# Patient Record
Sex: MALE | Race: WHITE | NOT HISPANIC OR LATINO | Employment: FULL TIME | URBAN - METROPOLITAN AREA
[De-identification: names, ages, dates, MRNs, and addresses within clinical notes are randomized per-mention and may not be internally consistent; named-entity substitution may affect disease eponyms.]

---

## 2023-07-20 ENCOUNTER — APPOINTMENT (EMERGENCY)
Dept: CT IMAGING | Facility: HOSPITAL | Age: 28
End: 2023-07-20
Payer: COMMERCIAL

## 2023-07-20 ENCOUNTER — HOSPITAL ENCOUNTER (EMERGENCY)
Facility: HOSPITAL | Age: 28
Discharge: HOME | End: 2023-07-21
Attending: EMERGENCY MEDICINE
Payer: COMMERCIAL

## 2023-07-20 VITALS
WEIGHT: 260.58 LBS | TEMPERATURE: 97.5 F | HEART RATE: 90 BPM | SYSTOLIC BLOOD PRESSURE: 155 MMHG | OXYGEN SATURATION: 97 % | RESPIRATION RATE: 18 BRPM | DIASTOLIC BLOOD PRESSURE: 75 MMHG

## 2023-07-20 DIAGNOSIS — H93.19 TINNITUS: ICD-10-CM

## 2023-07-20 DIAGNOSIS — J32.9 SINUSITIS: Primary | ICD-10-CM

## 2023-07-20 LAB
ALBUMIN SERPL BCP-MCNC: 4.9 G/DL (ref 3.5–5)
ALP SERPL-CCNC: 62 U/L (ref 34–104)
ALT SERPL W P-5'-P-CCNC: 32 U/L (ref 7–52)
ANION GAP SERPL CALCULATED.3IONS-SCNC: 9 MMOL/L
AST SERPL W P-5'-P-CCNC: 27 U/L (ref 13–39)
BASOPHILS # BLD AUTO: 0.03 THOUSANDS/ÂΜL (ref 0–0.1)
BASOPHILS NFR BLD AUTO: 0 % (ref 0–1)
BILIRUB SERPL-MCNC: 1.24 MG/DL (ref 0.2–1)
BUN SERPL-MCNC: 15 MG/DL (ref 5–25)
CALCIUM SERPL-MCNC: 9.3 MG/DL (ref 8.4–10.2)
CHLORIDE SERPL-SCNC: 102 MMOL/L (ref 96–108)
CO2 SERPL-SCNC: 27 MMOL/L (ref 21–32)
CREAT SERPL-MCNC: 1.08 MG/DL (ref 0.6–1.3)
EOSINOPHIL # BLD AUTO: 0.09 THOUSAND/ÂΜL (ref 0–0.61)
EOSINOPHIL NFR BLD AUTO: 1 % (ref 0–6)
ERYTHROCYTE [DISTWIDTH] IN BLOOD BY AUTOMATED COUNT: 11.9 % (ref 11.6–15.1)
GFR SERPL CREATININE-BSD FRML MDRD: 92 ML/MIN/1.73SQ M
GLUCOSE SERPL-MCNC: 92 MG/DL (ref 65–140)
HCT VFR BLD AUTO: 48.3 % (ref 36.5–49.3)
HGB BLD-MCNC: 17.3 G/DL (ref 12–17)
IMM GRANULOCYTES # BLD AUTO: 0.03 THOUSAND/UL (ref 0–0.2)
IMM GRANULOCYTES NFR BLD AUTO: 0 % (ref 0–2)
LYMPHOCYTES # BLD AUTO: 2.24 THOUSANDS/ÂΜL (ref 0.6–4.47)
LYMPHOCYTES NFR BLD AUTO: 19 % (ref 14–44)
MCH RBC QN AUTO: 32.1 PG (ref 26.8–34.3)
MCHC RBC AUTO-ENTMCNC: 35.8 G/DL (ref 31.4–37.4)
MCV RBC AUTO: 90 FL (ref 82–98)
MONOCYTES # BLD AUTO: 0.66 THOUSAND/ÂΜL (ref 0.17–1.22)
MONOCYTES NFR BLD AUTO: 6 % (ref 4–12)
NEUTROPHILS # BLD AUTO: 8.87 THOUSANDS/ÂΜL (ref 1.85–7.62)
NEUTS SEG NFR BLD AUTO: 74 % (ref 43–75)
NRBC BLD AUTO-RTO: 0 /100 WBCS
PLATELET # BLD AUTO: 215 THOUSANDS/UL (ref 149–390)
PMV BLD AUTO: 9.3 FL (ref 8.9–12.7)
POTASSIUM SERPL-SCNC: 3.7 MMOL/L (ref 3.5–5.3)
PROT SERPL-MCNC: 8.1 G/DL (ref 6.4–8.4)
RBC # BLD AUTO: 5.39 MILLION/UL (ref 3.88–5.62)
SODIUM SERPL-SCNC: 138 MMOL/L (ref 135–147)
WBC # BLD AUTO: 11.92 THOUSAND/UL (ref 4.31–10.16)

## 2023-07-20 PROCEDURE — 70450 CT HEAD/BRAIN W/O DYE: CPT

## 2023-07-20 PROCEDURE — 85025 COMPLETE CBC W/AUTO DIFF WBC: CPT | Performed by: EMERGENCY MEDICINE

## 2023-07-20 PROCEDURE — 80053 COMPREHEN METABOLIC PANEL: CPT | Performed by: EMERGENCY MEDICINE

## 2023-07-20 PROCEDURE — G1004 CDSM NDSC: HCPCS

## 2023-07-20 PROCEDURE — 70480 CT ORBIT/EAR/FOSSA W/O DYE: CPT

## 2023-07-20 PROCEDURE — 36415 COLL VENOUS BLD VENIPUNCTURE: CPT | Performed by: EMERGENCY MEDICINE

## 2023-07-21 RX ORDER — AZITHROMYCIN 250 MG/1
250 TABLET, FILM COATED ORAL DAILY
Qty: 4 TABLET | Refills: 0 | Status: SHIPPED | OUTPATIENT
Start: 2023-07-22 | End: 2023-07-26

## 2023-07-21 RX ORDER — AZITHROMYCIN 250 MG/1
500 TABLET, FILM COATED ORAL ONCE
Status: COMPLETED | OUTPATIENT
Start: 2023-07-21 | End: 2023-07-21

## 2023-07-21 RX ADMIN — AZITHROMYCIN MONOHYDRATE 500 MG: 250 TABLET ORAL at 01:10

## 2023-07-21 NOTE — ED NOTES
Pt now remembers that on Wednesday he hit the back of his head.        Freida Swenson RN  07/20/23 9491

## 2023-07-21 NOTE — ED PROVIDER NOTES
History  Chief Complaint   Patient presents with   • Tinnitus     Patient states he has had a ringing in his ear since last Friday. Has ENT appt for next Wednesday but has anxiety that it may be permanent and is unable to sleep. Also had a pop in his TMJ a few days ago which is causing him pain. Went to PCP and was given Voltaren which is not helping. Patient is a 28year old male with right sided tinnitus since last Friday. Has ENT appointment on this upcoming Wednesday. Had head trauma at work this past Wednesday over 1 week ago. No LOC. Occasional nausea at sight of food. Denies ear drop use. No excessive ASA use. No fever. No vomiting. No vertigo or dizziness. No decreased hearing. (+) anxiety. Was last seen at [facility] on 7/18/23 for medical exam. [facility] website checked on this patient and no Rx found. History provided by:  Patient (girlfriend)   used: No        None       History reviewed. No pertinent past medical history. History reviewed. No pertinent surgical history. History reviewed. No pertinent family history. I have reviewed and agree with the history as documented. E-Cigarette/Vaping     E-Cigarette/Vaping Substances     Social History     Tobacco Use   • Smoking status: Never   • Smokeless tobacco: Never   Substance Use Topics   • Alcohol use: Yes       Review of Systems   Constitutional: Negative for fever. HENT: Positive for tinnitus. Negative for hearing loss. Gastrointestinal: Positive for nausea (occasional). Negative for vomiting. Neurological: Negative for dizziness and headaches. No vertigo   Psychiatric/Behavioral: The patient is nervous/anxious. All other systems reviewed and are negative. Physical Exam  Physical Exam  Vitals and nursing note reviewed. Constitutional:       General: He is in acute distress (mild). HENT:      Head: Normocephalic and atraumatic.       Right Ear: Ear canal and external ear normal. There is no impacted cerumen. Left Ear: Tympanic membrane, ear canal and external ear normal.      Ears:      Comments: Fluid behind R TM. Mouth/Throat:      Mouth: Mucous membranes are moist.      Pharynx: Oropharynx is clear. No oropharyngeal exudate or posterior oropharyngeal erythema. Eyes:      General: No scleral icterus. Extraocular Movements: Extraocular movements intact. Pupils: Pupils are equal, round, and reactive to light. Cardiovascular:      Rate and Rhythm: Normal rate and regular rhythm. Heart sounds: Normal heart sounds. No murmur heard. Pulmonary:      Effort: Pulmonary effort is normal. No respiratory distress. Breath sounds: Normal breath sounds. Abdominal:      General: Bowel sounds are normal.      Palpations: Abdomen is soft. Tenderness: There is no abdominal tenderness. Musculoskeletal:         General: No deformity. Cervical back: Normal range of motion and neck supple. No rigidity. Right lower leg: No edema. Left lower leg: No edema. Skin:     General: Skin is warm and dry. Findings: No erythema or rash. Neurological:      General: No focal deficit present. Mental Status: He is alert and oriented to person, place, and time. Sensory: No sensory deficit. Motor: No weakness.    Psychiatric:      Comments: Somewhat anxious         Vital Signs  ED Triage Vitals [07/20/23 2057]   Temperature Pulse Respirations Blood Pressure SpO2   97.5 °F (36.4 °C) 90 18 155/75 97 %      Temp Source Heart Rate Source Patient Position - Orthostatic VS BP Location FiO2 (%)   Oral Monitor Lying Right arm --      Pain Score       --           Vitals:    07/20/23 2057   BP: 155/75   Pulse: 90   Patient Position - Orthostatic VS: Lying         Visual Acuity      ED Medications  Medications   azithromycin (ZITHROMAX) tablet 500 mg (has no administration in time range)       Diagnostic Studies  Results Reviewed     Procedure Component Value Units Date/Time    Comprehensive metabolic panel [[id number]]  (Abnormal) Collected: 07/20/23 2224    Lab Status: Final result Specimen: Blood from Arm, Right Updated: 07/20/23 2247     Sodium 138 mmol/L      Potassium 3.7 mmol/L      Chloride 102 mmol/L      CO2 27 mmol/L      ANION GAP 9 mmol/L      BUN 15 mg/dL      Creatinine 1.08 mg/dL      Glucose 92 mg/dL      Calcium 9.3 mg/dL      AST 27 U/L      ALT 32 U/L      Alkaline Phosphatase 62 U/L      Total Protein 8.1 g/dL      Albumin 4.9 g/dL      Total Bilirubin 1.24 mg/dL      eGFR 92 ml/min/1.73sq m     Narrative:      [place] guidelines for Chronic Kidney Disease (CKD):   •  Stage 1 with normal or high GFR (GFR > 90 mL/min/1.73 square meters)  •  Stage 2 Mild CKD (GFR = 60-89 mL/min/1.73 square meters)  •  Stage 3A Moderate CKD (GFR = 45-59 mL/min/1.73 square meters)  •  Stage 3B Moderate CKD (GFR = 30-44 mL/min/1.73 square meters)  •  Stage 4 Severe CKD (GFR = 15-29 mL/min/1.73 square meters)  •  Stage 5 End Stage CKD (GFR <15 mL/min/1.73 square meters)  Note: GFR calculation is accurate only with a steady state creatinine    CBC and differential [[id number]]  (Abnormal) Collected: 07/20/23 2224    Lab Status: Final result Specimen: Blood from Arm, Right Updated: 07/20/23 2234     WBC 11.92 Thousand/uL      RBC 5.39 Million/uL      Hemoglobin 17.3 g/dL      Hematocrit 48.3 %      MCV 90 fL      MCH 32.1 pg      MCHC 35.8 g/dL      RDW 11.9 %      MPV 9.3 fL      Platelets 070 Thousands/uL      nRBC 0 /100 WBCs      Neutrophils Relative 74 %      Immat GRANS % 0 %      Lymphocytes Relative 19 %      Monocytes Relative 6 %      Eosinophils Relative 1 %      Basophils Relative 0 %      Neutrophils Absolute 8.87 Thousands/µL      Immature Grans Absolute 0.03 Thousand/uL      Lymphocytes Absolute 2.24 Thousands/µL      Monocytes Absolute 0.66 Thousand/µL      Eosinophils Absolute 0.09 Thousand/µL      Basophils Absolute 0.03 Thousands/µL CT orbits/temporal bones/skull base wo contrast   ED Interpretation by Shirlene Morris MD (07/21 9761)   COMPARISON: No relevant prior studies available. FINDINGS: Right inner ear: Normal. Right ossicles and middle ear: Normal. The middle ear ossicles are intact. Right external auditory canal: Normal. Right facial nerve canal: Normal. Right jugular foramen: No jugular dehiscence. Right carotid canal: No aberrant carotid canal. Right mastoid air cells: Normal. No mastoid effusions. Left inner ear: Normal. Left ossicles and middle ear: Normal. The middle ear ossicles are intact. Left external auditory canal: Normal. Left facial nerve canal: Normal. Left jugular foramen: No jugular dehiscence. Left carotid canal: No aberrant carotid canal. Left mastoid air cells: Normal. No mastoid effusions. Paranasal sinuses: The right maxillary sinus is completely filled with HU 26 suggesting mildly complex fluid and age indeterminate sinusitis, possibly acute. Remainder of the paranasal sinuses are clear. Soft tissues: Unremarkable. IMPRESSION: Complete obstruction of the right maxillary sinus, consiste   nt with sinusitis that may be acute (depending upon clinical setting) Thank you for allowing us to participate in the care of your patient. Dictated and Authenticated by: Ghazala Bradford MD 07/21/2023 12:39 AM [facility] Time ([address] [place] [address] 19[facility]          CT head without contrast   ED Interpretation by Shirlene Morirs MD (07/21 0006)   FINDINGS:     PARENCHYMA:  No intracranial mass, mass effect or midline shift. No CT signs of acute infarction. No acute parenchymal hemorrhage.     VENTRICLES AND EXTRA-AXIAL SPACES:  Normal for the patient's age.     VISUALIZED ORBITS: Normal visualized orbits.     PARANASAL SINUSES: Opacification of the right maxillary sinus is seen. The remaining paranasal sinuses and mastoid air cells appear well aerated and clear without air-fluid levels. .     CALVARIUM AND EXTRACRANIAL SOFT TISSUES: Bony calvarium and temporomandibular joints are intact. Scalp soft tissues are grossly unremarkable.     IMPRESSION:     No acute intracranial abnormality. Complete opacification of the right maxillary sinus.                 Workstation performed: [id number]      Final Result by Cristian Garrett MD (07/20 2352)      No acute intracranial abnormality. Complete opacification of the right maxillary sinus. Workstation performed: [id number]                    Procedures  Procedures         ED Course  ED Course as of 07/21/23 0105   Thu Jul 20, 2023   2316 Labs d/w patient and girlfriend with patient's permission. Fri Jul 21, 2023   0100 CT d/w patient and girlfriend. 0102 WBC(!): 11.92  Zithromax po ordered for sinusitis. Medical Decision Making  DDx including but not limited to: tinnitus, cerumen impaction, trauma, presbycusis, hearing loss, otosclerosis, Meniere's disease, TMJ disorder, acoustic neuroma, other tumor, muscle spasm, multiple sclerosis, eustachian tube dysfunction, high blood pressure; doubt AVM, adverse medication reaction (antibiotics (mycins), aspirin, quinine, diuretics, MTX, cisplatin), antidepressants. Amount and/or Complexity of Data Reviewed  Labs: ordered. Decision-making details documented in ED Course. Radiology: ordered. Decision-making details documented in ED Course. Disposition  Final diagnoses:   Sinusitis   Tinnitus     Time reflects when diagnosis was documented in both MDM as applicable and the Disposition within this note     Time User Action Codes Description Comment    7/21/2023 12:07 AM Florestine Shorts Add [J32.9] Sinusitis     7/21/2023 12:07 AM Florestine Shorts Add [H93.19] Tinnitus       ED Disposition     ED Disposition   Discharge    Condition   Stable    Date/Time   Fri Jul 21, 2023  1:02 AM    Comment   Samaria Resendiz discharge to home/self care.                Follow-up Information Follow up With Specialties Details Why Dennis William MD Otolaryngology Call in 5 days or go to own ENT next week. Return sooner if increased ringing in ears, pain, fever, vomiting, rash, difficulty breathing. [address] [place] [address]            Patient's Medications   Discharge Prescriptions    AZITHROMYCIN (ZITHROMAX) 250 MG TABLET    Take 1 tablet (250 mg total) by mouth daily for 4 days Do not start before July 22, 2023. Start Date: 7/22/2023 End Date: 7/26/2023       Order Dose: 250 mg       Quantity: 4 tablet    Refills: 0       No discharge procedures on file.     PDMP Review       Value Time User    PDMP Reviewed  Yes 7/20/2023 10:01 PM Nohemi Villeda MD          ED Provider  Electronically Signed by           Nohemi Villeda MD  07/21/23 0640

## 2024-01-29 LAB
BASOPHILS # BLD AUTO: 0.04 THOUSANDS/ÂΜL (ref 0–0.1)
BASOPHILS NFR BLD AUTO: 0 % (ref 0–1)
EOSINOPHIL # BLD AUTO: 0.3 THOUSAND/ÂΜL (ref 0–0.61)
EOSINOPHIL NFR BLD AUTO: 3 % (ref 0–6)
ERYTHROCYTE [DISTWIDTH] IN BLOOD BY AUTOMATED COUNT: 12 % (ref 11.6–15.1)
HCT VFR BLD AUTO: 47.2 % (ref 36.5–49.3)
HGB BLD-MCNC: 16.9 G/DL (ref 12–17)
IMM GRANULOCYTES # BLD AUTO: 0.04 THOUSAND/UL (ref 0–0.2)
IMM GRANULOCYTES NFR BLD AUTO: 0 % (ref 0–2)
LYMPHOCYTES # BLD AUTO: 2.86 THOUSANDS/ÂΜL (ref 0.6–4.47)
LYMPHOCYTES NFR BLD AUTO: 28 % (ref 14–44)
MCH RBC QN AUTO: 32.3 PG (ref 26.8–34.3)
MCHC RBC AUTO-ENTMCNC: 35.8 G/DL (ref 31.4–37.4)
MCV RBC AUTO: 90 FL (ref 82–98)
MONOCYTES # BLD AUTO: 0.68 THOUSAND/ÂΜL (ref 0.17–1.22)
MONOCYTES NFR BLD AUTO: 7 % (ref 4–12)
NEUTROPHILS # BLD AUTO: 6.17 THOUSANDS/ÂΜL (ref 1.85–7.62)
NEUTS SEG NFR BLD AUTO: 62 % (ref 43–75)
NRBC BLD AUTO-RTO: 0 /100 WBCS
PLATELET # BLD AUTO: 212 THOUSANDS/UL (ref 149–390)
PMV BLD AUTO: 9.6 FL (ref 8.9–12.7)
RBC # BLD AUTO: 5.23 MILLION/UL (ref 3.88–5.62)
WBC # BLD AUTO: 10.09 THOUSAND/UL (ref 4.31–10.16)

## 2024-01-29 PROCEDURE — 85025 COMPLETE CBC W/AUTO DIFF WBC: CPT | Performed by: EMERGENCY MEDICINE

## 2024-01-29 PROCEDURE — 84484 ASSAY OF TROPONIN QUANT: CPT | Performed by: EMERGENCY MEDICINE

## 2024-01-29 PROCEDURE — 80053 COMPREHEN METABOLIC PANEL: CPT | Performed by: EMERGENCY MEDICINE

## 2024-01-29 PROCEDURE — 93005 ELECTROCARDIOGRAM TRACING: CPT

## 2024-01-29 PROCEDURE — 36415 COLL VENOUS BLD VENIPUNCTURE: CPT

## 2024-01-29 PROCEDURE — 99285 EMERGENCY DEPT VISIT HI MDM: CPT

## 2024-01-30 ENCOUNTER — APPOINTMENT (EMERGENCY)
Dept: RADIOLOGY | Facility: HOSPITAL | Age: 29
End: 2024-01-30
Payer: COMMERCIAL

## 2024-01-30 ENCOUNTER — HOSPITAL ENCOUNTER (EMERGENCY)
Facility: HOSPITAL | Age: 29
Discharge: HOME | End: 2024-01-30
Attending: EMERGENCY MEDICINE
Payer: COMMERCIAL

## 2024-01-30 VITALS
HEART RATE: 91 BPM | TEMPERATURE: 98 F | SYSTOLIC BLOOD PRESSURE: 145 MMHG | DIASTOLIC BLOOD PRESSURE: 94 MMHG | RESPIRATION RATE: 18 BRPM | OXYGEN SATURATION: 98 %

## 2024-01-30 DIAGNOSIS — R00.2 PALPITATIONS: Primary | ICD-10-CM

## 2024-01-30 LAB
ALBUMIN SERPL BCP-MCNC: 4.7 G/DL (ref 3.5–5)
ALP SERPL-CCNC: 61 U/L (ref 34–104)
ALT SERPL W P-5'-P-CCNC: 34 U/L (ref 7–52)
ANION GAP SERPL CALCULATED.3IONS-SCNC: 8 MMOL/L
AST SERPL W P-5'-P-CCNC: 23 U/L (ref 13–39)
ATRIAL RATE: 86 BPM
BILIRUB SERPL-MCNC: 0.8 MG/DL (ref 0.2–1)
BUN SERPL-MCNC: 14 MG/DL (ref 5–25)
CALCIUM SERPL-MCNC: 9.3 MG/DL (ref 8.4–10.2)
CARDIAC TROPONIN I PNL SERPL HS: <2 NG/L
CHLORIDE SERPL-SCNC: 102 MMOL/L (ref 96–108)
CO2 SERPL-SCNC: 26 MMOL/L (ref 21–32)
CREAT SERPL-MCNC: 0.94 MG/DL (ref 0.6–1.3)
GFR SERPL CREATININE-BSD FRML MDRD: 109 ML/MIN/1.73SQ M
GLUCOSE SERPL-MCNC: 73 MG/DL (ref 65–140)
GLUCOSE SERPL-MCNC: 94 MG/DL (ref 65–140)
P AXIS: 50 DEGREES
POTASSIUM SERPL-SCNC: 3.7 MMOL/L (ref 3.5–5.3)
PR INTERVAL: 180 MS
PROT SERPL-MCNC: 8 G/DL (ref 6.4–8.4)
QRS AXIS: 7 DEGREES
QRSD INTERVAL: 90 MS
QT INTERVAL: 352 MS
QTC INTERVAL: 421 MS
SODIUM SERPL-SCNC: 136 MMOL/L (ref 135–147)
T WAVE AXIS: 4 DEGREES
VENTRICULAR RATE: 86 BPM

## 2024-01-30 PROCEDURE — 71045 X-RAY EXAM CHEST 1 VIEW: CPT

## 2024-01-30 PROCEDURE — 99285 EMERGENCY DEPT VISIT HI MDM: CPT | Performed by: EMERGENCY MEDICINE

## 2024-01-30 PROCEDURE — 82948 REAGENT STRIP/BLOOD GLUCOSE: CPT

## 2024-01-30 PROCEDURE — 93010 ELECTROCARDIOGRAM REPORT: CPT | Performed by: INTERNAL MEDICINE

## 2024-01-30 NOTE — ED PROVIDER NOTES
"History  Chief Complaint   Patient presents with    Palpitations     Reports episode today of \"a sudden chill down my back\" reports felt lightheaded, cold sweats and felt heart racing states HR was in 130s. Reprots episode lasted for 45 minutes. Denies CP or SOB, reports mild sensation of palpitations     HPI    27 yo male presents for evaluation of palpitations.  Reports intermittent symptoms for the past 1 month, which she describes as a \"chill\" in his lower back, that is present intermittently throughout the day, not associated with any symptoms.  Today, however, while he was walking at work he developed the same chills, with associated nausea, diaphoresis, lightheadedness and palpitations.  Heart rate at time of incident and 130s.  Episode lasted for about 45 minutes to an hour, did not improve with food or rest, gradually subsided over the hour.  He does report he has had several stressors in the past 3 months, including the death of his mother and pregnancy of his fiancée.  Does not have a history of anxiety.  Denies nausea, headache, abdominal pain, diarrhea, chest pain, SOB at this time.    Prior to Admission Medications   Prescriptions Last Dose Informant Patient Reported? Taking?   amoxicillin-clavulanate (AUGMENTIN) 875-125 mg per tablet   Yes No   betamethasone dipropionate (DIPROSONE) 0.05 % ointment   Yes No   hydrOXYzine HCL (ATARAX) 25 mg tablet   Yes No   Patient not taking: Reported on 7/26/2023      Facility-Administered Medications: None       Past Medical History:   Diagnosis Date    Asthma     Ear problems     Tinnitus        Past Surgical History:   Procedure Laterality Date    WISDOM TOOTH EXTRACTION         No family history on file.  I have reviewed and agree with the history as documented.    E-Cigarette/Vaping     E-Cigarette/Vaping Substances     Social History     Tobacco Use    Smoking status: Never    Smokeless tobacco: Never   Substance Use Topics    Alcohol use: Yes        Review of " Systems   Constitutional:  Positive for diaphoresis. Negative for chills and fever.   HENT:  Negative for rhinorrhea and sore throat.    Respiratory:  Negative for cough and shortness of breath.    Cardiovascular:  Negative for chest pain.   Gastrointestinal:  Negative for abdominal pain, nausea and vomiting.   Genitourinary:  Negative for dysuria and frequency.   Skin:  Negative for color change.   Neurological:  Positive for light-headedness. Negative for dizziness and headaches.       Physical Exam  ED Triage Vitals [01/29/24 2328]   Temperature Pulse Respirations Blood Pressure SpO2   98 °F (36.7 °C) 104 18 145/94 98 %      Temp Source Heart Rate Source Patient Position - Orthostatic VS BP Location FiO2 (%)   Oral Monitor -- -- --      Pain Score       No Pain             Orthostatic Vital Signs  Vitals:    01/29/24 2328 01/30/24 0120   BP: 145/94    Pulse: 104 91       Physical Exam  Vitals and nursing note reviewed.   Constitutional:       General: He is not in acute distress.     Appearance: He is well-developed.   HENT:      Head: Normocephalic and atraumatic.      Mouth/Throat:      Mouth: Mucous membranes are moist.   Eyes:      Conjunctiva/sclera: Conjunctivae normal.      Pupils: Pupils are equal, round, and reactive to light.   Cardiovascular:      Rate and Rhythm: Normal rate and regular rhythm.      Heart sounds: No murmur heard.  Pulmonary:      Effort: Pulmonary effort is normal. No respiratory distress.      Breath sounds: Normal breath sounds.   Abdominal:      Palpations: Abdomen is soft.      Tenderness: There is no abdominal tenderness.   Musculoskeletal:         General: No swelling.      Cervical back: Neck supple.   Skin:     General: Skin is warm and dry.      Capillary Refill: Capillary refill takes less than 2 seconds.   Neurological:      General: No focal deficit present.      Mental Status: He is alert.   Psychiatric:         Mood and Affect: Mood normal.         ED  Medications  Medications - No data to display    Diagnostic Studies  Results Reviewed       Procedure Component Value Units Date/Time    Fingerstick Glucose (POCT) [[id number]]  (Normal) Collected: 01/30/24 0038    Lab Status: Final result Updated: 01/30/24 0040     POC Glucose 73 mg/dl     HS Troponin 0hr (reflex protocol) [[id number]]  (Normal) Collected: 01/29/24 2333    Lab Status: Final result Specimen: Blood from Arm, Left Updated: 01/30/24 0019     hs TnI 0hr <2 ng/L     Comprehensive metabolic panel [[id number]] Collected: 01/29/24 2333    Lab Status: Final result Specimen: Blood from Arm, Left Updated: 01/30/24 0005     Sodium 136 mmol/L      Potassium 3.7 mmol/L      Chloride 102 mmol/L      CO2 26 mmol/L      ANION GAP 8 mmol/L      BUN 14 mg/dL      Creatinine 0.94 mg/dL      Glucose 94 mg/dL      Calcium 9.3 mg/dL      AST 23 U/L      ALT 34 U/L      Alkaline Phosphatase 61 U/L      Total Protein 8.0 g/dL      Albumin 4.7 g/dL      Total Bilirubin 0.80 mg/dL      eGFR 109 ml/min/1.73sq m     Narrative:      [employer] guidelines for Chronic Kidney Disease (CKD):     Stage 1 with normal or high GFR (GFR > 90 mL/min/1.73 square meters)    Stage 2 Mild CKD (GFR = 60-89 mL/min/1.73 square meters)    Stage 3A Moderate CKD (GFR = 45-59 mL/min/1.73 square meters)    Stage 3B Moderate CKD (GFR = 30-44 mL/min/1.73 square meters)    Stage 4 Severe CKD (GFR = 15-29 mL/min/1.73 square meters)    Stage 5 End Stage CKD (GFR <15 mL/min/1.73 square meters)  Note: GFR calculation is accurate only with a steady state creatinine    CBC and differential [[id number]] Collected: 01/29/24 2333    Lab Status: Final result Specimen: Blood from Arm, Left Updated: 01/29/24 2347     WBC 10.09 Thousand/uL      RBC 5.23 Million/uL      Hemoglobin 16.9 g/dL      Hematocrit 47.2 %      MCV 90 fL      MCH 32.3 pg      MCHC 35.8 g/dL      RDW 12.0 %      MPV 9.6 fL      Platelets 212 Thousands/uL      nRBC 0 /100  WBCs      Neutrophils Relative 62 %      Immat GRANS % 0 %      Lymphocytes Relative 28 %      Monocytes Relative 7 %      Eosinophils Relative 3 %      Basophils Relative 0 %      Neutrophils Absolute 6.17 Thousands/µL      Immature Grans Absolute 0.04 Thousand/uL      Lymphocytes Absolute 2.86 Thousands/µL      Monocytes Absolute 0.68 Thousand/µL      Eosinophils Absolute 0.30 Thousand/µL      Basophils Absolute 0.04 Thousands/µL                    XR chest 1 view portable   Final Result by Richard Boss MD (01/30 0910)      No acute cardiopulmonary disease.                  Resident: JEFF LERNER I, the attending radiologist, have reviewed the images and agree with the final report above.      Workstation performed: [id number]               Procedures  ECG 12 Lead Documentation Only    Date/Time: 1/30/2024 1:07 AM    Performed by: Javad Schuler MD  Authorized by: Javad Schuler MD    Rate:     ECG rate:  86  Rhythm:     Rhythm: sinus rhythm    QRS:     QRS axis:  Normal    QRS intervals:  Normal  Comments:      No ST elevations or ischemic changes.  Normal [place] and QT intervals.        ED Course                                       Medical Decision Making  28-year-old male presents for evaluation of palpitations.    Differentials include but not limited to ACS, arrhythmia, pneumonia, anxiety, panic attack.    Tests: CBC, CMP, Troponin, CXR ordered.     Labs, and CXR unremarkable. ACS ruled out. VSS. Discussed findings with pt, and recommended follow up with PCP and cardiology for further evaluation.     Amount and/or Complexity of Data Reviewed  Labs: ordered.  Radiology: ordered.          Disposition  Final diagnoses:   Palpitations     Time reflects when diagnosis was documented in both MDM as applicable and the Disposition within this note       Time User Action Codes Description Comment    1/30/2024  1:31 AM Javad Schuler Add [R00.2] Palpitations           ED Disposition        ED Disposition   Discharge    Condition   Stable    Date/Time   Tue Jan 30, 2024  1:32 AM    Comment   George Bowles discharge to home/self care.                   Follow-up Information    None         Discharge Medication List as of 1/30/2024  1:33 AM        CONTINUE these medications which have NOT CHANGED    Details   amoxicillin-clavulanate (AUGMENTIN) 875-125 mg per tablet Starting Fri 7/21/2023, Historical Med      betamethasone dipropionate (DIPROSONE) 0.05 % ointment Starting Tue 7/18/2023, Historical Med      hydrOXYzine HCL (ATARAX) 25 mg tablet Starting Fri 7/21/2023, Historical Med               PDMP Review         Value Time User    PDMP Reviewed  Yes 7/20/2023 10:01 PM Navid Damico MD             ED Provider  Attending physically available and evaluated George Bowles. I managed the patient along with the ED Attending.    Electronically Signed by           Javad Schuler MD  02/04/24 9575

## 2024-01-30 NOTE — DISCHARGE INSTRUCTIONS
Please follow up with your PCP for further management.     Please return to ED if you develop chest pain, difficulty breathing, seizures, passing out.

## 2024-01-30 NOTE — ED ATTENDING ATTESTATION
1/29/2024  IAnuja DO, saw and evaluated the patient. I have discussed the patient with the resident/non-physician practitioner and agree with the resident's/non-physician practitioner's findings, Plan of Care, and MDM as documented in the resident's/non-physician practitioner's note, except where noted. All available labs and Radiology studies were reviewed.  I was present for key portions of any procedure(s) performed by the resident/non-physician practitioner and I was immediately available to provide assistance.       At this point I agree with the current assessment done in the Emergency Department.  I have conducted an independent evaluation of this patient a history and physical is as follows:    History  Patient is a 28 y.o. year old male who presents for evaluation with palpitations. Patient states that today while he was walking, he developed a sensation of heart palpitations and lightheadedness. Patient checked his watch at the time which said that his HR was in the 130's. Patient states that the symptoms lasted for a few seconds and then resolved spontaneously.  He denies any previous episodes of similar symptoms, denies any current symptoms including chest pain, shortness of breath, palpitations, abdominal pain, nausea, or vomiting.  Patient does endorse some recent life stressors.     Current Outpatient Medications   Medication Instructions    amoxicillin-clavulanate (AUGMENTIN) 875-125 mg per tablet No dose, route, or frequency recorded.    betamethasone dipropionate (DIPROSONE) 0.05 % ointment No dose, route, or frequency recorded.    hydrOXYzine HCL (ATARAX) 25 mg tablet No dose, route, or frequency recorded.     Past Medical History:   Diagnosis Date    Asthma     Ear problems     Tinnitus      Past Surgical History:   Procedure Laterality Date    WISDOM TOOTH EXTRACTION         Objective  Vitals:    01/29/24 2328 01/30/24 0120   BP: 145/94    Pulse: 104 91   Resp: 18    Temp: 98 °F (36.7  °C)    TempSrc: Oral    SpO2: 98%        General: VSS, NAD, awake, alert.    Head: Normocephalic, atraumatic.  Eyes: PERRL, EOM-I.   ENT: Atraumatic external nose and ears.  MMM. No stridor.   Neck: Symmetric, supple, trachea midline.  CV: RRR. +S1/S2. No murmurs. Peripheral pulses +2 throughout.   Lungs: Respirations unlabored, no tachypnea. CTAB, lungs sounds equal bilateral.   MSK: Extremities without tenderness or gross deformity. No lower extremity edema.   Skin: Dry, intact. No lesions.  Neuro: AAOx3, GCS 15, CN II-XII grossly intact. Motor grossly intact. Sensory grossly intact.  Psychiatric/Behavioral: Appropriate mood and affect. Behavior normal.    Results Reviewed       Procedure Component Value Units Date/Time    Fingerstick Glucose (POCT) [[id number]]  (Normal) Collected: 01/30/24 0038    Lab Status: Final result Updated: 01/30/24 0040     POC Glucose 73 mg/dl     HS Troponin 0hr (reflex protocol) [[id number]]  (Normal) Collected: 01/29/24 2333    Lab Status: Final result Specimen: Blood from Arm, Left Updated: 01/30/24 0019     hs TnI 0hr <2 ng/L     Comprehensive metabolic panel [[id number]] Collected: 01/29/24 2333    Lab Status: Final result Specimen: Blood from Arm, Left Updated: 01/30/24 0005     Sodium 136 mmol/L      Potassium 3.7 mmol/L      Chloride 102 mmol/L      CO2 26 mmol/L      ANION GAP 8 mmol/L      BUN 14 mg/dL      Creatinine 0.94 mg/dL      Glucose 94 mg/dL      Calcium 9.3 mg/dL      AST 23 U/L      ALT 34 U/L      Alkaline Phosphatase 61 U/L      Total Protein 8.0 g/dL      Albumin 4.7 g/dL      Total Bilirubin 0.80 mg/dL      eGFR 109 ml/min/1.73sq m     Narrative:      National Kidney Disease Foundation guidelines for Chronic Kidney Disease (CKD):     Stage 1 with normal or high GFR (GFR > 90 mL/min/1.73 square meters)    Stage 2 Mild CKD (GFR = 60-89 mL/min/1.73 square meters)    Stage 3A Moderate CKD (GFR = 45-59 mL/min/1.73 square meters)    Stage 3B Moderate CKD (GFR = 30-44  mL/min/1.73 square meters)    Stage 4 Severe CKD (GFR = 15-29 mL/min/1.73 square meters)    Stage 5 End Stage CKD (GFR <15 mL/min/1.73 square meters)  Note: GFR calculation is accurate only with a steady state creatinine    CBC and differential [[id number]] Collected: 01/29/24 2333    Lab Status: Final result Specimen: Blood from Arm, Left Updated: 01/29/24 2347     WBC 10.09 Thousand/uL      RBC 5.23 Million/uL      Hemoglobin 16.9 g/dL      Hematocrit 47.2 %      MCV 90 fL      MCH 32.3 pg      MCHC 35.8 g/dL      RDW 12.0 %      MPV 9.6 fL      Platelets 212 Thousands/uL      nRBC 0 /100 WBCs      Neutrophils Relative 62 %      Immat GRANS % 0 %      Lymphocytes Relative 28 %      Monocytes Relative 7 %      Eosinophils Relative 3 %      Basophils Relative 0 %      Neutrophils Absolute 6.17 Thousands/µL      Immature Grans Absolute 0.04 Thousand/uL      Lymphocytes Absolute 2.86 Thousands/µL      Monocytes Absolute 0.68 Thousand/µL      Eosinophils Absolute 0.30 Thousand/µL      Basophils Absolute 0.04 Thousands/µL           XR chest 1 view portable    (Results Pending)     Medications - No data to display  ED Course as of 01/30/24 0205   Tue Jan 30, 2024   0132 Procedure Note: EKG  Date/Time: 01/30/24 1:32 AM   Interpreted by: Anuja Nava D.O.  Indications / Diagnosis: palpitations  ECG reviewed by me, the ED Provider: yes   The EKG demonstrates:  Rhythm: normal sinus with sinus arrhythmia  Intervals: normal intervals  Axis: normal axis  QRS/Blocks: normal QRS  ST Changes: No acute ST Changes, no STD/AYE. Non-specific T wave inversion in lead III.        MDM  28-year-old male presents for evaluation of palpitations.  On exam, patient with normal vitals, in no acute distress.  Patient's EKG shows sinus rhythm with a sinus arrhythmia, otherwise negative for acute ischemic changes or other pathology.  Basic labs including CBC and CMP unremarkable.  Patient remains asymptomatic at this time.  Patient given a referral  for cardiology for further evaluation.  At this time, patient stable for discharge.  He was given symptomatic care instructions and strict ED return precautions.    Final Diagnosis:  1. Palpitations          Critical Care Time  Procedures

## 2024-09-18 ENCOUNTER — HOSPITAL ENCOUNTER (EMERGENCY)
Facility: HOSPITAL | Age: 29
Discharge: HOME | End: 2024-09-19
Attending: INTERNAL MEDICINE
Payer: COMMERCIAL

## 2024-09-18 VITALS
RESPIRATION RATE: 20 BRPM | SYSTOLIC BLOOD PRESSURE: 123 MMHG | TEMPERATURE: 99.7 F | OXYGEN SATURATION: 100 % | WEIGHT: 265 LBS | BODY MASS INDEX: 37.1 KG/M2 | HEART RATE: 112 BPM | DIASTOLIC BLOOD PRESSURE: 86 MMHG | HEIGHT: 71 IN

## 2024-09-18 DIAGNOSIS — B34.9 VIRAL ILLNESS: Primary | ICD-10-CM

## 2024-09-18 LAB — GLUCOSE SERPL-MCNC: 123 MG/DL (ref 65–140)

## 2024-09-18 PROCEDURE — 93005 ELECTROCARDIOGRAM TRACING: CPT

## 2024-09-18 PROCEDURE — 87811 SARS-COV-2 COVID19 W/OPTIC: CPT

## 2024-09-18 PROCEDURE — 82948 REAGENT STRIP/BLOOD GLUCOSE: CPT

## 2024-09-18 PROCEDURE — 87804 INFLUENZA ASSAY W/OPTIC: CPT

## 2024-09-18 PROCEDURE — 99285 EMERGENCY DEPT VISIT HI MDM: CPT | Performed by: INTERNAL MEDICINE

## 2024-09-18 PROCEDURE — 99284 EMERGENCY DEPT VISIT MOD MDM: CPT

## 2024-09-18 RX ORDER — IBUPROFEN 600 MG/1
600 TABLET, FILM COATED ORAL ONCE
Status: COMPLETED | OUTPATIENT
Start: 2024-09-18 | End: 2024-09-18

## 2024-09-18 RX ORDER — ACETAMINOPHEN 325 MG/1
975 TABLET ORAL ONCE
Status: COMPLETED | OUTPATIENT
Start: 2024-09-18 | End: 2024-09-18

## 2024-09-18 RX ADMIN — ACETAMINOPHEN 325MG 975 MG: 325 TABLET ORAL at 23:13

## 2024-09-18 RX ADMIN — IBUPROFEN 600 MG: 600 TABLET ORAL at 23:13

## 2024-09-19 LAB
ATRIAL RATE: 118 BPM
FLUAV AG UPPER RESP QL IA.RAPID: NEGATIVE
FLUBV AG UPPER RESP QL IA.RAPID: NEGATIVE
P AXIS: 58 DEGREES
PR INTERVAL: 174 MS
QRS AXIS: 58 DEGREES
QRSD INTERVAL: 88 MS
QT INTERVAL: 310 MS
QTC INTERVAL: 434 MS
SARS-COV+SARS-COV-2 AG RESP QL IA.RAPID: NEGATIVE
T WAVE AXIS: 18 DEGREES
VENTRICULAR RATE: 118 BPM

## 2024-09-19 PROCEDURE — 93010 ELECTROCARDIOGRAM REPORT: CPT | Performed by: INTERNAL MEDICINE

## 2024-09-19 NOTE — ED PROVIDER NOTES
No diagnosis found.  ED Disposition       None          Assessment & Plan       Medical Decision Making  This patient presents with symptoms suspicious for likely viral upper respiratory infection. Based on history and physical doubt sinusitis. FLU/COVID test negative. Do not suspect underlying cardiopulmonary process. I considered, but think unlikely, dangerous causes of this patient's symptoms to include ACS, CHF or COPD exacerbations, pneumonia, pneumothorax. Patient is nontoxic appearing and not in need of emergent medical intervention. Patient told to self isolate at home until symptoms subside for 72 hours. Elevated HR and temp likely sequela of underlying URI. Patient discharged home with return precautions discussed.    Amount and/or Complexity of Data Reviewed  Labs: ordered.    Risk  OTC drugs.  Prescription drug management.                     Medications - No data to display    History of Present Illness       Pt is a 28 y.o. male who presents to the ED on September 18, 2024. Patient presents with chills, myalgias, fever, cough.  Patient states that starting earlier with this morning, he started to have after mentioned symptoms.  Patient also states he has associated loss of appetite, lethargy, and fatigue.  States that mother his child recently felt sick and had similar symptoms.  Patient attempted taking Tylenol with minimal change in symptoms.  Patient also found to be persistently tachycardic.  Denies any palpitations, or chest pain.  Patient denies any  shortness of breath, abdominal pain, nausea, vomiting, rigors, tremors, any other symptoms at this time.          Review of Systems   Constitutional:  Positive for chills, fatigue and fever.   HENT:  Negative for ear pain and sore throat.    Eyes:  Negative for pain and visual disturbance.   Respiratory:  Positive for cough. Negative for chest tightness, shortness of breath, wheezing and stridor.    Cardiovascular:  Negative for chest pain and  palpitations.   Gastrointestinal:  Negative for abdominal pain, constipation, diarrhea, nausea and vomiting.   Genitourinary:  Negative for dysuria and hematuria.   Musculoskeletal:  Positive for myalgias. Negative for arthralgias and back pain.   Skin:  Negative for color change and rash.   Neurological:  Negative for seizures and syncope.   All other systems reviewed and are negative.          Objective     ED Triage Vitals   Temperature Pulse Blood Pressure Respirations SpO2 Patient Position - Orthostatic VS   09/18/24 2221 09/18/24 2224 09/18/24 2224 09/18/24 2224 09/18/24 2224 09/18/24 2224   99.7 °F (37.6 °C) (!) 126 123/86 20 100 % Sitting      Temp Source Heart Rate Source BP Location FiO2 (%) Pain Score    09/18/24 2221 -- 09/18/24 2224 -- --    Oral  Left arm          Physical Exam  Vitals and nursing note reviewed.   Constitutional:       General: He is not in acute distress.     Appearance: He is well-developed.   HENT:      Head: Normocephalic and atraumatic.      Nose: Congestion and rhinorrhea present.   Eyes:      Conjunctiva/sclera: Conjunctivae normal.   Cardiovascular:      Rate and Rhythm: Regular rhythm. Tachycardia present.      Heart sounds: No murmur heard.     No friction rub. No gallop.   Pulmonary:      Effort: Pulmonary effort is normal. No respiratory distress.      Breath sounds: Normal breath sounds. No wheezing, rhonchi or rales.   Abdominal:      General: There is no distension.      Palpations: Abdomen is soft.      Tenderness: There is no abdominal tenderness. There is no guarding or rebound.   Musculoskeletal:         General: No swelling.      Cervical back: Neck supple.   Skin:     General: Skin is warm and dry.      Capillary Refill: Capillary refill takes less than 2 seconds.   Neurological:      Mental Status: He is alert.   Psychiatric:         Mood and Affect: Mood normal.         Labs Reviewed - No data to display  No orders to display       ECG 12 Lead Documentation  Only    Date/Time: 9/18/2024 11:30 PM    Performed by: Oscar Edwards MD  Authorized by: Oscar Edwards MD    ECG reviewed by me, the ED Provider: yes    Previous ECG:     Previous ECG:  Compared to current    Similarity:  No change  Interpretation:     Interpretation: normal    Rate:     ECG rate:  86    ECG rate assessment: normal    Rhythm:     Rhythm: sinus rhythm    Ectopy:     Ectopy: none    QRS:     QRS axis:  Normal  Conduction:     Conduction: normal    ST segments:     ST segments:  Normal  T waves:     T waves: normal             Oscar Edwards MD  09/19/24 0008

## 2024-09-19 NOTE — DISCHARGE INSTRUCTIONS
Please get adequate rest and hydration for your viral illness.  Please follow-up with your PCP.  For fever reduction please alternate ibuprofen 600 mg and extra strength Tylenol 1000 mg every 4 hours as needed while awake.  Please take appropriate isolation precautions.

## 2024-09-20 ENCOUNTER — HOSPITAL ENCOUNTER (EMERGENCY)
Facility: HOSPITAL | Age: 29
Discharge: HOME | End: 2024-09-20
Attending: EMERGENCY MEDICINE
Payer: COMMERCIAL

## 2024-09-20 ENCOUNTER — APPOINTMENT (EMERGENCY)
Dept: RADIOLOGY | Facility: HOSPITAL | Age: 29
End: 2024-09-20
Payer: COMMERCIAL

## 2024-09-20 VITALS
HEART RATE: 100 BPM | TEMPERATURE: 98.1 F | RESPIRATION RATE: 22 BRPM | SYSTOLIC BLOOD PRESSURE: 133 MMHG | DIASTOLIC BLOOD PRESSURE: 80 MMHG | OXYGEN SATURATION: 97 %

## 2024-09-20 DIAGNOSIS — J06.9 URI (UPPER RESPIRATORY INFECTION): Primary | ICD-10-CM

## 2024-09-20 LAB
ANION GAP SERPL CALCULATED.3IONS-SCNC: 10 MMOL/L (ref 4–13)
BASOPHILS # BLD AUTO: 0.04 THOUSANDS/ΜL (ref 0–0.1)
BASOPHILS NFR BLD AUTO: 0 % (ref 0–1)
BUN SERPL-MCNC: 10 MG/DL (ref 5–25)
CALCIUM SERPL-MCNC: 9.2 MG/DL (ref 8.4–10.2)
CHLORIDE SERPL-SCNC: 103 MMOL/L (ref 96–108)
CO2 SERPL-SCNC: 22 MMOL/L (ref 21–32)
CREAT SERPL-MCNC: 1.1 MG/DL (ref 0.6–1.3)
EOSINOPHIL # BLD AUTO: 0.02 THOUSAND/ΜL (ref 0–0.61)
EOSINOPHIL NFR BLD AUTO: 0 % (ref 0–6)
ERYTHROCYTE [DISTWIDTH] IN BLOOD BY AUTOMATED COUNT: 11.9 % (ref 11.6–15.1)
GFR SERPL CREATININE-BSD FRML MDRD: 89 ML/MIN/1.73SQ M
GLUCOSE SERPL-MCNC: 89 MG/DL (ref 65–140)
HCT VFR BLD AUTO: 44.7 % (ref 36.5–49.3)
HGB BLD-MCNC: 16.1 G/DL (ref 12–17)
IMM GRANULOCYTES # BLD AUTO: 0.04 THOUSAND/UL (ref 0–0.2)
IMM GRANULOCYTES NFR BLD AUTO: 0 % (ref 0–2)
LYMPHOCYTES # BLD AUTO: 0.87 THOUSANDS/ΜL (ref 0.6–4.47)
LYMPHOCYTES NFR BLD AUTO: 9 % (ref 14–44)
MCH RBC QN AUTO: 32.3 PG (ref 26.8–34.3)
MCHC RBC AUTO-ENTMCNC: 36 G/DL (ref 31.4–37.4)
MCV RBC AUTO: 90 FL (ref 82–98)
MONOCYTES # BLD AUTO: 1.09 THOUSAND/ΜL (ref 0.17–1.22)
MONOCYTES NFR BLD AUTO: 11 % (ref 4–12)
NEUTROPHILS # BLD AUTO: 7.82 THOUSANDS/ΜL (ref 1.85–7.62)
NEUTS SEG NFR BLD AUTO: 80 % (ref 43–75)
NRBC BLD AUTO-RTO: 0 /100 WBCS
PLATELET # BLD AUTO: 169 THOUSANDS/UL (ref 149–390)
PMV BLD AUTO: 9.9 FL (ref 8.9–12.7)
POTASSIUM SERPL-SCNC: 3.9 MMOL/L (ref 3.5–5.3)
RBC # BLD AUTO: 4.98 MILLION/UL (ref 3.88–5.62)
SODIUM SERPL-SCNC: 135 MMOL/L (ref 135–147)
WBC # BLD AUTO: 9.88 THOUSAND/UL (ref 4.31–10.16)

## 2024-09-20 PROCEDURE — 96360 HYDRATION IV INFUSION INIT: CPT

## 2024-09-20 PROCEDURE — 96361 HYDRATE IV INFUSION ADD-ON: CPT

## 2024-09-20 PROCEDURE — 71046 X-RAY EXAM CHEST 2 VIEWS: CPT

## 2024-09-20 PROCEDURE — 80048 BASIC METABOLIC PNL TOTAL CA: CPT

## 2024-09-20 PROCEDURE — 85025 COMPLETE CBC W/AUTO DIFF WBC: CPT

## 2024-09-20 PROCEDURE — 99284 EMERGENCY DEPT VISIT MOD MDM: CPT | Performed by: EMERGENCY MEDICINE

## 2024-09-20 PROCEDURE — 36415 COLL VENOUS BLD VENIPUNCTURE: CPT

## 2024-09-20 PROCEDURE — 99283 EMERGENCY DEPT VISIT LOW MDM: CPT

## 2024-09-20 RX ORDER — ALBUTEROL SULFATE 90 UG/1
2 INHALANT RESPIRATORY (INHALATION) ONCE
Status: COMPLETED | OUTPATIENT
Start: 2024-09-20 | End: 2024-09-20

## 2024-09-20 RX ADMIN — SODIUM CHLORIDE 1000 ML: 0.9 INJECTION, SOLUTION INTRAVENOUS at 18:01

## 2024-09-20 RX ADMIN — ALBUTEROL SULFATE 2 PUFF: 90 AEROSOL, METERED RESPIRATORY (INHALATION) at 18:06

## 2024-09-20 NOTE — Clinical Note
George Bowles was seen and treated in our emergency department on 9/20/2024.    No restrictions            Diagnosis:     George  may return to work on return date.    He may return on this date: 09/23/2024         If you have any questions or concerns, please don't hesitate to call.      Nicholas Virgen MD    ______________________________           _______________          _______________  Hospital Representative                              Date                                Time

## 2024-09-20 NOTE — ED ATTENDING ATTESTATION
9/18/2024  I, Sandra Agustin MD, saw and evaluated the patient. I have discussed the patient with the resident/non-physician practitioner and agree with the resident's/non-physician practitioner's findings, Plan of Care, and MDM as documented in the resident's/non-physician practitioner's note, except where noted. All available labs and Radiology studies were reviewed.  I was present for key portions of any procedure(s) performed by the resident/non-physician practitioner and I was immediately available to provide assistance.       At this point I agree with the current assessment done in the Emergency Department.  I have conducted an independent evaluation of this patient a history and physical is as follows:    ED Course  ED Course as of 09/20/24 0942   Wed Sep 18, 2024   2318 Pulse(!): 112  104 during last ED visit    2327 POC Glucose: 123   2328 EKG: sinus cardia, no significant changes from previous EKG     28-year-old man presents to ED for evaluation of fever, cough, myalgias, fatigue and chills.  Symptoms started this morning.  He reports his significant other had same symptoms.  Took Tylenol with no significant improvement. On exam the patient is awake, alert, and comfortable. Mucous membranes are moist. The patient's heart is regular. No respiratory distress.  Abdomen non-distended. Moves all extremities. Patient neurologically nonfocal. No skin rashes. Back without deformities. MDM: Patient was seen in the ED today for a viral upper respiratory illness. Symptomatic therapy we discussed included push fluids, rest and use acetaminophen, ibuprofen prn for fevers and body aches. Lack of antibiotic effectiveness discussed with patient/family. Call or return to clinic prn if these symptoms worsen or fail to improve as anticipated.        Critical Care Time  Procedures

## 2024-09-20 NOTE — ED ATTENDING ATTESTATION
9/20/2024  I, Jose Luis Emery MD, saw and evaluated the patient. I have discussed the patient with the resident/non-physician practitioner and agree with the resident's/non-physician practitioner's findings, Plan of Care, and MDM as documented in the resident's/non-physician practitioner's note, except where noted. All available labs and Radiology studies were reviewed.  I was present for key portions of any procedure(s) performed by the resident/non-physician practitioner and I was immediately available to provide assistance.       At this point I agree with the current assessment done in the Emergency Department.  I have conducted an independent evaluation of this patient a history and physical is as follows:  Briefly, 29-year-old male with cough, body aches, muscle aches, headache, chills, sweats, sore throat over the past 3 days.  Patient states that he has been taking Tylenol ibuprofen with some improvement of symptoms but has had persistent symptoms.  He denies trauma, chest pain, abdominal pain, nausea, vomiting, other symptoms.    On examination, mild erythema to the posterior oropharynx but no significant swelling, heart sounds normal, lungs clear to auscultation.  Well-appearing, no respiratory distress.  Based on repeat visit, screening labs ordered, returned reassuring, no acute disease on chest x-ray.  Most consistent with viral syndrome, do not suspect bacterial pneumonia, sepsis, pneumothorax, other acute life threat.  Treated symptomatically, discharged with strict return precautions.    ED Course         Critical Care Time  Procedures

## 2024-09-21 NOTE — DISCHARGE INSTRUCTIONS
Stay well-hydrated.  Take Tylenol/Motrin as needed for fever/pain.    Return to ED if worsening shortness of breath, wheezing, or concerning symptoms develop.    Follow-up with your PCP.

## 2024-09-21 NOTE — ED PROVIDER NOTES
1. URI (upper respiratory infection)      ED Disposition       ED Disposition   Discharge    Condition   Stable    Date/Time   Fri Sep 20, 2024  8:07 PM    Comment   George Bowles discharge to home/self care.                   Assessment & Plan       Medical Decision Making  See ED course    Amount and/or Complexity of Data Reviewed  Labs: ordered.  Radiology: ordered and independent interpretation performed.    Risk  Prescription drug management.                ED Course as of 09/21/24 1429   Fri Sep 20, 2024   1807 29-year-old otherwise healthy male presenting for cold-like symptoms.  Patient states he been having cough, congestion, runny nose for the past 3 days.  He was evaluated in the ED 2 days ago with a negative COVID/flu/RSV workup.  Has been taking Tylenol/Motrin as needed for fever.  States his symptoms have been worsening with decreased p.o. intake, nausea, generalized weakness.  No vomiting.  No diarrhea.  Reports objective fevers.  No measured fevers.  States he has been having increased green mucus production.  Denies acute shortness of breath.   1809 Differential at this time includes URI, viral pneumonia, bronchitis.  Low suspicion for flu at this time.   1936 Chest x-ray with no acute cardiopulmonary findings.   2005 CBC/CMP grossly normal.   Sat Sep 21, 2024   1428 Provided 1 L fluids and albuterol treatment.   1429 On reevaluation, patient states he is feeling better.  Discharged in stable condition with outpatient follow-up.       Medications   sodium chloride 0.9 % bolus 1,000 mL (0 mL Intravenous Stopped 9/20/24 2013)   albuterol (PROVENTIL HFA,VENTOLIN HFA) inhaler 2 puff (2 puffs Inhalation Given 9/20/24 1806)       History of Present Illness       29-year-old otherwise healthy male presenting for cold-like symptoms.  Patient states he been having cough, congestion, runny nose for the past 3 days.  He was evaluated in the ED 2 days ago with a negative COVID/flu/RSV workup.  Has been  taking Tylenol/Motrin as needed for fever.  States his symptoms have been worsening with decreased p.o. intake, nausea, generalized weakness.  No vomiting.  No diarrhea.  Reports subjective fevers.  No measured fevers.  States he has been having increased green mucus production.  Denies acute shortness of breath.        URI  Presenting symptoms: congestion, cough and rhinorrhea    Presenting symptoms: no ear pain, no fever and no sore throat    Associated symptoms: no arthralgias        Review of Systems   Constitutional:  Negative for chills and fever.   HENT:  Positive for congestion, postnasal drip and rhinorrhea. Negative for ear pain, sore throat and trouble swallowing.    Eyes:  Negative for visual disturbance.   Respiratory:  Positive for cough. Negative for shortness of breath.    Cardiovascular:  Negative for chest pain and palpitations.   Gastrointestinal:  Negative for abdominal pain and vomiting.   Genitourinary:  Negative for dysuria and hematuria.   Musculoskeletal:  Negative for arthralgias and back pain.   Skin:  Negative for color change and rash.   Neurological:  Negative for seizures and syncope.   All other systems reviewed and are negative.          Objective     ED Triage Vitals [09/20/24 1733]   Temperature Pulse Blood Pressure Respirations SpO2 Patient Position - Orthostatic VS   98.1 °F (36.7 °C) 100 133/80 22 97 % Sitting      Temp Source Heart Rate Source BP Location FiO2 (%) Pain Score    Oral Monitor Right arm -- --        Physical Exam  Vitals and nursing note reviewed.   Constitutional:       General: He is not in acute distress.     Appearance: He is well-developed.   HENT:      Head: Normocephalic and atraumatic.      Mouth/Throat:      Mouth: Mucous membranes are moist.   Eyes:      Conjunctiva/sclera: Conjunctivae normal.   Cardiovascular:      Rate and Rhythm: Normal rate and regular rhythm.      Heart sounds: No murmur heard.  Pulmonary:      Effort: Pulmonary effort is normal.  No respiratory distress.      Breath sounds: Normal breath sounds. No stridor. No wheezing or rhonchi.   Abdominal:      Palpations: Abdomen is soft.      Tenderness: There is no abdominal tenderness.   Musculoskeletal:         General: No swelling.      Cervical back: Neck supple.   Skin:     General: Skin is warm and dry.      Capillary Refill: Capillary refill takes less than 2 seconds.   Neurological:      Mental Status: He is alert.   Psychiatric:         Mood and Affect: Mood normal.         Labs Reviewed   CBC AND DIFFERENTIAL - Abnormal       Result Value    WBC 9.88      RBC 4.98      Hemoglobin 16.1      Hematocrit 44.7      MCV 90      MCH 32.3      MCHC 36.0      RDW 11.9      MPV 9.9      Platelets 169      nRBC 0      Segmented % 80 (*)     Immature Grans % 0      Lymphocytes % 9 (*)     Monocytes % 11      Eosinophils Relative 0      Basophils Relative 0      Absolute Neutrophils 7.82 (*)     Absolute Immature Grans 0.04      Absolute Lymphocytes 0.87      Absolute Monocytes 1.09      Eosinophils Absolute 0.02      Basophils Absolute 0.04     BASIC METABOLIC PANEL    Sodium 135      Potassium 3.9      Chloride 103      CO2 22      ANION GAP 10      BUN 10      Creatinine 1.10      Glucose 89      Calcium 9.2      eGFR 89      Narrative:     National Kidney Disease Foundation guidelines for Chronic Kidney Disease (CKD):     Stage 1 with normal or high GFR (GFR > 90 mL/min/1.73 square meters)    Stage 2 Mild CKD (GFR = 60-89 mL/min/1.73 square meters)    Stage 3A Moderate CKD (GFR = 45-59 mL/min/1.73 square meters)    Stage 3B Moderate CKD (GFR = 30-44 mL/min/1.73 square meters)    Stage 4 Severe CKD (GFR = 15-29 mL/min/1.73 square meters)    Stage 5 End Stage CKD (GFR <15 mL/min/1.73 square meters)  Note: GFR calculation is accurate only with a steady state creatinine     XR chest 2 views   ED Interpretation by Nicholas Virgen MD (09/20 1858)   No acute cardiopulmonary findings.      Final Interpretation  by Presley Bentley MD (09/20 2212)      No acute cardiopulmonary disease.            Workstation performed: [id number]             Procedures    ED Medication and Procedure Management   Prior to Admission Medications   Prescriptions Last Dose Informant Patient Reported? Taking?   amoxicillin-clavulanate (AUGMENTIN) 875-125 mg per tablet   Yes No   Patient not taking: Reported on 9/18/2024   betamethasone dipropionate (DIPROSONE) 0.05 % ointment   Yes No   Patient not taking: Reported on 9/18/2024   hydrOXYzine HCL (ATARAX) 25 mg tablet   Yes No   Patient not taking: Reported on 7/26/2023      Facility-Administered Medications: None     Discharge Medication List as of 9/20/2024  8:07 PM        CONTINUE these medications which have NOT CHANGED    Details   amoxicillin-clavulanate (AUGMENTIN) 875-125 mg per tablet Starting Fri 7/21/2023, Historical Med      betamethasone dipropionate (DIPROSONE) 0.05 % ointment Starting Tue 7/18/2023, Historical Med      hydrOXYzine HCL (ATARAX) 25 mg tablet Starting Fri 7/21/2023, Historical Med           No discharge procedures on file.     Nicholas Virgen MD  09/21/24 [id number]

## 2024-09-29 ENCOUNTER — APPOINTMENT (EMERGENCY)
Dept: RADIOLOGY | Facility: HOSPITAL | Age: 29
End: 2024-09-29
Payer: COMMERCIAL

## 2024-09-29 ENCOUNTER — HOSPITAL ENCOUNTER (EMERGENCY)
Facility: HOSPITAL | Age: 29
Discharge: HOME | End: 2024-09-29
Attending: EMERGENCY MEDICINE
Payer: COMMERCIAL

## 2024-09-29 VITALS
HEART RATE: 99 BPM | SYSTOLIC BLOOD PRESSURE: 138 MMHG | RESPIRATION RATE: 20 BRPM | OXYGEN SATURATION: 95 % | DIASTOLIC BLOOD PRESSURE: 73 MMHG | TEMPERATURE: 98.1 F

## 2024-09-29 DIAGNOSIS — R11.0 NAUSEA: ICD-10-CM

## 2024-09-29 DIAGNOSIS — R05.9 COUGH: ICD-10-CM

## 2024-09-29 DIAGNOSIS — J18.9 PNEUMONIA: Primary | ICD-10-CM

## 2024-09-29 PROCEDURE — 94640 AIRWAY INHALATION TREATMENT: CPT

## 2024-09-29 PROCEDURE — 99284 EMERGENCY DEPT VISIT MOD MDM: CPT | Performed by: EMERGENCY MEDICINE

## 2024-09-29 PROCEDURE — 99283 EMERGENCY DEPT VISIT LOW MDM: CPT

## 2024-09-29 PROCEDURE — 71046 X-RAY EXAM CHEST 2 VIEWS: CPT

## 2024-09-29 RX ORDER — DOXYCYCLINE HYCLATE 100 MG
100 TABLET ORAL 2 TIMES DAILY
Qty: 20 TABLET | Refills: 0 | Status: SHIPPED | OUTPATIENT
Start: 2024-09-30 | End: 2024-10-10

## 2024-09-29 RX ORDER — DOXYCYCLINE 100 MG/1
100 CAPSULE ORAL ONCE
Status: COMPLETED | OUTPATIENT
Start: 2024-09-29 | End: 2024-09-29

## 2024-09-29 RX ORDER — ONDANSETRON 4 MG/1
4 TABLET, ORALLY DISINTEGRATING ORAL ONCE
Status: COMPLETED | OUTPATIENT
Start: 2024-09-29 | End: 2024-09-29

## 2024-09-29 RX ORDER — ALBUTEROL SULFATE 5 MG/ML
2.5 SOLUTION RESPIRATORY (INHALATION) ONCE
Status: COMPLETED | OUTPATIENT
Start: 2024-09-29 | End: 2024-09-29

## 2024-09-29 RX ORDER — ONDANSETRON 4 MG/1
4 TABLET, ORALLY DISINTEGRATING ORAL EVERY 6 HOURS PRN
Qty: 20 TABLET | Refills: 0 | Status: SHIPPED | OUTPATIENT
Start: 2024-09-30

## 2024-09-29 RX ORDER — DEXAMETHASONE 4 MG/1
4 TABLET ORAL ONCE
Status: COMPLETED | OUTPATIENT
Start: 2024-09-29 | End: 2024-09-29

## 2024-09-29 RX ADMIN — DOXYCYCLINE 100 MG: 100 CAPSULE ORAL at 19:09

## 2024-09-29 RX ADMIN — ALBUTEROL SULFATE 2.5 MG: 2.5 SOLUTION RESPIRATORY (INHALATION) at 18:07

## 2024-09-29 RX ADMIN — ONDANSETRON 4 MG: 4 TABLET, ORALLY DISINTEGRATING ORAL at 19:09

## 2024-09-29 RX ADMIN — DEXAMETHASONE 4 MG: 4 TABLET ORAL at 19:09

## 2024-09-29 NOTE — DISCHARGE INSTRUCTIONS
You were evaluated in the emergency department for: cough. You can access your current and pending results through [facility]. A radiologist will take a second look at your X-Rays, if you had any, and you will be contacted with any new findings.     - You should follow-up with your primary care provider, as soon as possible, for re-evaluation.  - You are being prescribed Doxycycline for pneumonia  - You are also being prescribed zofran, for nausea    Please, return to the emergency department if you experience new or worsening symptoms, fever, chest pain, shortness of breath, difficulty breathing, dizziness, abdominal pain, persistent nausea/vomiting, syncope or passing out, blood in your urine or stool, coughing up blood, leg swelling/pain, urinary retention, bowel or bladder incontinence, numbness between your legs.

## 2024-09-29 NOTE — Clinical Note
accompanied George Bowles to the emergency department on 9/29/2024.    Return date if applicable:         If you have any questions or concerns, please don't hesitate to call.      Jose Luis Emery MD

## 2024-09-29 NOTE — ED PROVIDER NOTES
Final diagnoses:   Pneumonia   Cough   Nausea     ED Disposition       ED Disposition   Discharge    Condition   Stable    Date/Time   Sun Sep 29, 2024  6:37 PM    Comment   George Bowles discharge to home/self care.                   Assessment & Plan       Medical Decision Making  Amount and/or Complexity of Data Reviewed  Radiology: ordered and independent interpretation performed. Decision-making details documented in ED Course.    Risk  Prescription drug management.      George Bowles is a 29 y.o. male presenting with productive cough. Associated symptoms: fatigue, myalgias, nausea, diarrhea, decreased PO intake. Vitals unremarkable. Exam remarkable for coarse breath sounds on the right side, otherwise unremarkable.      DDx including but not limited to: URI, bronchiolitis, bronchitis, pneumonia, GERD, aspiration pneumonitis, viral illness.    Plan:  - Will get CXR to evaluate for cardiopulmonary causes of symptoms  - Will give albuterol nebulizer as patient does have remote history of asthma, though this is more to attempt for improved comfort rather than treatment of desaturations as he has been normally saturating during time in the ED  - Will give Zofran as patient is nausea    Will continue to monitor while patient is in the ED and reconsider further evaluation or intervention as needed.    See ED course for further updates and interpretation of results.    Based on these results and H&P, despite lack of focal consolidation on CXR, given abnormal lung sounds, ongoing and worsening symptoms despite cefdinir, concern for atypical pneumonia. Also possible that patient has concurrent or new viral syndrome but given persistence and abnormal lung sounds, will treat for atypical pneumonia with doxycyline. Also given history of asthma, will give decadron.  Patient was able to ambulate around the ED without significant desaturations, saturating in the mid 90s.  No significant shortness of breath on  ambulation.  Was able to tolerate p.o. intake.  As his vitals have been overall reassuring during time here in the ED, and he has not required acute intervention, he would most benefit from outpatient management.  He is stable for discharge.    Results, clinical impressions, and plan were discussed with patient and family. They expressed understanding and were in agreement with plan. Patient was given the opportunity to ask questions in ED. All questions and concerns were addressed in ED.    After evaluation and workup in the emergency department patient appears improved, is nontoxic appearing, expresses understanding and agrees with plan of care at this time. In light of this patient would benefit from outpatient management. Discussed strict return precautions.  Discussed importance of following up with PCP in the next few days.  All questions answered.  Patient is agreeable to discharge.    ED Course as of 09/30/24 0246   Sun Sep 29, 2024   1718 SpO2: 95 %   1734 Seen and evaluated at bedside     1854 XR chest 2 views  IMPRESSION:     Nonspecific diffuse interstitial prominence in the lungs. This may represent viral syndrome. Correlate with clinical scenario       Medications   albuterol inhalation solution 2.5 mg (2.5 mg Nebulization Given 9/29/24 1807)   ondansetron (ZOFRAN-ODT) dispersible tablet 4 mg (4 mg Oral Given 9/29/24 1909)   doxycycline hyclate (VIBRAMYCIN) capsule 100 mg (100 mg Oral Given 9/29/24 1909)   dexamethasone (DECADRON) tablet 4 mg (4 mg Oral Given 9/29/24 1909)       ED Risk Strat Scores                           SBIRT 20yo+      Flowsheet Row Most Recent Value   Initial Alcohol Screen: US AUDIT-C     1. How often do you have a drink containing alcohol? 0 Filed at: 09/29/2024 1717   2. How many drinks containing alcohol do you have on a typical day you are drinking?  0 Filed at: 09/29/2024 1717   3a. Male UNDER 65: How often do you have five or more drinks on one occasion? 0 Filed at:  09/29/2024 1717   Audit-C Score 0 Filed at: 09/29/2024 1717   JERRI: How many times in the past year have you...    Used an illegal drug or used a prescription medication for non-medical reasons? Never Filed at: 09/29/2024 1717                            History of Present Illness       Chief Complaint   Patient presents with    URI     Has been sick for a few weeks. Has been seen for it. Its getting worse. On cefdinir. [facility] at [facility] today and his SPO2 dropped to 92 then they lost power. Patient has a laundry list of symptoms written down for the provider.       Past Medical History:   Diagnosis Date    Asthma     Ear problems     Tinnitus       Past Surgical History:   Procedure Laterality Date    WISDOM TOOTH EXTRACTION        History reviewed. No pertinent family history.   Social History     Tobacco Use    Smoking status: Never    Smokeless tobacco: Never   Substance Use Topics    Alcohol use: Yes      E-Cigarette/Vaping      E-Cigarette/Vaping Substances      I have reviewed and agree with the history as documented.     HPI    George Bowles is a 29 y.o. male with history of childhood asthma, tinnitus working with ENT presenting for ongoing cough.    Patient reports onset of recently cough productive of yellow/green sputum about 4-6 days ago. Including fatigue, no appetite, lightheaded and increased awareness of his heartbeat though denies tachycardia. Some dyspnea with exertion. Initially had fevers with congestion and rhinorrhea but fevers have since resolved, none recently. Reports nausea and diarrhea as well, decreased food intake though has been drinking. Still having normal urination. Started having left ear pain and congestion today as well. Felling hearbeat. Going on for a total of 1 week on Tuesday. Was given Cefdiir x 5 days but reports symptoms have been ongoing and have not resolved, if anything have worsened.  [facility], and reports that while he was being  evaluated they lost power, however they were concerned by a saturation reading of 92%.  Also reports that they took an x-ray which possibly showed concern for pneumonia.  Considering the fact that they did not have power, he was sent to the ED for further evaluation.    Review of Systems   Constitutional:  Positive for appetite change (decreased) and fatigue. Negative for chills and fever.   HENT:  Positive for congestion and ear pain. Negative for rhinorrhea and sore throat.    Eyes:  Negative for pain and visual disturbance.   Respiratory:  Positive for cough and shortness of breath (With exertion). Negative for chest tightness, wheezing and stridor.    Cardiovascular:  Negative for chest pain, palpitations and leg swelling.   Gastrointestinal:  Positive for diarrhea and nausea. Negative for abdominal pain, constipation and vomiting.   Genitourinary:  Negative for dysuria and hematuria.   Musculoskeletal:  Negative for arthralgias and back pain.   Skin:  Negative for color change, pallor and rash.   Neurological:  Negative for dizziness, syncope, numbness and headaches.   Psychiatric/Behavioral:  Negative for behavioral problems.    All other systems reviewed and are negative.          Objective       ED Triage Vitals [09/29/24 1717]   Temperature Pulse Blood Pressure Respirations SpO2 Patient Position - Orthostatic VS   98.1 °F (36.7 °C) 99 138/73 20 95 % Sitting      Temp Source Heart Rate Source BP Location FiO2 (%) Pain Score    Oral Monitor Right arm -- --      Vitals      Date and Time Temp Pulse SpO2 Resp BP Pain Score FACES Pain Rating User   09/29/24 1717 98.1 °F (36.7 °C) 99 95 % 20 138/73 -- -- ND            Physical Exam  Vitals and nursing note reviewed.   Constitutional:       Appearance: Normal appearance. He is well-developed. He is not ill-appearing or toxic-appearing.   HENT:      Head: Normocephalic and atraumatic.      Right Ear: Tympanic membrane, ear canal and external ear normal.      Left  Ear: Tympanic membrane, ear canal and external ear normal.      Nose: Congestion present. No rhinorrhea.      Comments: Mild maxillary sinus pressure on palpation     Mouth/Throat:      Mouth: Mucous membranes are moist.      Pharynx: Oropharynx is clear.   Eyes:      Extraocular Movements: Extraocular movements intact.      Conjunctiva/sclera: Conjunctivae normal.      Pupils: Pupils are equal, round, and reactive to light.   Cardiovascular:      Rate and Rhythm: Normal rate and regular rhythm.      Pulses: Normal pulses.      Heart sounds: Normal heart sounds. No murmur heard.  Pulmonary:      Effort: Pulmonary effort is normal. No respiratory distress.      Breath sounds: Examination of the right-upper field reveals rhonchi. Examination of the right-middle field reveals rhonchi. Examination of the right-lower field reveals rhonchi. Rhonchi (Coarse breath sounds) present. No decreased breath sounds or wheezing.   Abdominal:      General: Abdomen is flat. Bowel sounds are normal.      Palpations: Abdomen is soft.      Tenderness: There is no abdominal tenderness. There is no right CVA tenderness, left CVA tenderness, guarding or rebound.   Musculoskeletal:      Cervical back: Neck supple.      Right lower leg: No edema.      Left lower leg: No edema.   Skin:     General: Skin is warm and dry.      Capillary Refill: Capillary refill takes less than 2 seconds.   Neurological:      General: No focal deficit present.      Mental Status: He is alert and oriented to person, place, and time.   Psychiatric:         Mood and Affect: Mood normal.         Behavior: Behavior normal.         Results Reviewed       None            XR chest 2 views   ED Interpretation by Marnie Bryant MD (09/29 1837)   Per my interpretation: No acute cardiopulmonary disease      Final Interpretation by Jamar Joyner MD (09/29 1841)      Nonspecific diffuse interstitial prominence in the lungs. This may represent viral syndrome.  Correlate with clinical scenario            Workstation performed: [id number]             Procedures    ED Medication and Procedure Management   Prior to Admission Medications   Prescriptions Last Dose Informant Patient Reported? Taking?   amoxicillin-clavulanate (AUGMENTIN) 875-125 mg per tablet   Yes No   Patient not taking: Reported on 9/18/2024   betamethasone dipropionate (DIPROSONE) 0.05 % ointment   Yes No   Patient not taking: Reported on 9/18/2024   hydrOXYzine HCL (ATARAX) 25 mg tablet   Yes No   Patient not taking: Reported on 7/26/2023      Facility-Administered Medications: None     Discharge Medication List as of 9/29/2024  7:03 PM        START taking these medications    Details   doxycycline hyclate (VIBRA-TABS) 100 mg tablet Take 1 tablet (100 mg total) by mouth 2 (two) times a day for 10 days Do not start before September 30, 2024., Starting Mon 9/30/2024, Until Thu 10/10/2024, Normal      ondansetron (ZOFRAN-ODT) 4 mg disintegrating tablet Take 1 tablet (4 mg total) by mouth every 6 (six) hours as needed for nausea or vomiting Do not start before September 30, 2024., Starting Mon 9/30/2024, Normal           CONTINUE these medications which have NOT CHANGED    Details   amoxicillin-clavulanate (AUGMENTIN) 875-125 mg per tablet Starting Fri 7/21/2023, Historical Med      betamethasone dipropionate (DIPROSONE) 0.05 % ointment Starting Tue 7/18/2023, Historical Med      hydrOXYzine HCL (ATARAX) 25 mg tablet Starting Fri 7/21/2023, Historical Med           No discharge procedures on file.  ED SEPSIS DOCUMENTATION   Time reflects when diagnosis was documented in both MDM as applicable and the Disposition within this note       Time User Action Codes Description Comment    9/29/2024  6:37 PM Marnie Bryant [J18.9] Pneumonia     9/29/2024  6:38 PM Marnie Bryant [R05.9] Cough     9/29/2024  6:38 PM Marnie Bryant [R11.0] Nausea                  Marnie Bryant MD  09/30/24  0248

## 2024-09-29 NOTE — ED ATTENDING ATTESTATION
9/29/2024  I, Jose Luis Emery MD, saw and evaluated the patient. I have discussed the patient with the resident/non-physician practitioner and agree with the resident's/non-physician practitioner's findings, Plan of Care, and MDM as documented in the resident's/non-physician practitioner's note, except where noted. All available labs and Radiology studies were reviewed.  I was present for key portions of any procedure(s) performed by the resident/non-physician practitioner and I was immediately available to provide assistance.       At this point I agree with the current assessment done in the Emergency Department.  I have conducted an independent evaluation of this patient a history and physical is as follows:    Briefly, 29-year-old male with nasal congestion, body aches, productive cough and ear pain.  Patient also notes a few loose stools.  He was seen previously, diagnosed with viral syndrome, seen again, started on cefdinir which he has been taking for 4 days.  He states his respiratory symptoms have only worsened despite taking the cefdinir.  He denies fever, trauma, numbness, weakness, chest pain, other symptoms.  On examination, heart sounds normal, crackles noted overlying the right lower lung fields, no respiratory distress.  Abdomen nontender.  Chest x-ray overall reassuring, however based on increased productive sputum, focal abnormalities on lung exam, and clinical worsening, some concern for atypical pneumonia or bacterial superinfection of viral process, potentially with radiographic lag.  Started on doxycycline to cover atypicals, with patient declining azithromycin as he is concerned it may make his tinnitus worse.  Responded well to symptomatic treatment  in ER.  Do not suspect pneumothorax, sepsis, PE, ACS, other acute life threat.        ED Course         Critical Care Time  Procedures

## 2024-09-29 NOTE — Clinical Note
George Bowles was seen and treated in our emergency department on 9/29/2024.                Diagnosis:     George  .    He may return on this date: 10/02/2024         If you have any questions or concerns, please don't hesitate to call.      Marnie Bryant MD    ______________________________           _______________          _______________  Hospital Representative                              Date                                Time